# Patient Record
Sex: FEMALE | Race: WHITE | Employment: FULL TIME | URBAN - METROPOLITAN AREA
[De-identification: names, ages, dates, MRNs, and addresses within clinical notes are randomized per-mention and may not be internally consistent; named-entity substitution may affect disease eponyms.]

---

## 2017-04-05 PROBLEM — R73.09 ELEVATED HEMOGLOBIN A1C: Status: ACTIVE | Noted: 2017-04-05

## 2017-12-09 ENCOUNTER — OFFICE VISIT (OUTPATIENT)
Dept: FAMILY MEDICINE CLINIC | Facility: CLINIC | Age: 37
End: 2017-12-09

## 2017-12-09 VITALS
WEIGHT: 111 LBS | OXYGEN SATURATION: 99 % | DIASTOLIC BLOOD PRESSURE: 88 MMHG | RESPIRATION RATE: 20 BRPM | SYSTOLIC BLOOD PRESSURE: 112 MMHG | HEART RATE: 89 BPM | TEMPERATURE: 99 F | BODY MASS INDEX: 21.79 KG/M2 | HEIGHT: 60 IN

## 2017-12-09 DIAGNOSIS — R05.9 COUGH: Primary | ICD-10-CM

## 2017-12-09 PROCEDURE — 99202 OFFICE O/P NEW SF 15 MIN: CPT | Performed by: NURSE PRACTITIONER

## 2017-12-09 NOTE — PROGRESS NOTES
CHIEF COMPLAINT:   Patient presents with:  Cold: had a cold needs a note to return back to work      HPI:   Ce Vital is a 30year old female who presents after recovering from cold symptoms x3 days. Missed 3 days of work and returned Friday.  The GENERAL: well developed, well nourished,in no apparent distress  SKIN: no rashes,no suspicious lesions  HEAD: atraumatic, normocephalic.     EYES: conjunctiva clear, EOM intact  EARS: TM's clear, no bulging, no retraction,no fluid, bony landmarks present

## 2018-04-16 PROBLEM — R03.0 ELEVATED BLOOD PRESSURE READING WITHOUT DIAGNOSIS OF HYPERTENSION: Status: ACTIVE | Noted: 2018-04-16

## 2018-04-16 PROCEDURE — 88175 CYTOPATH C/V AUTO FLUID REDO: CPT | Performed by: FAMILY MEDICINE

## 2021-10-07 ENCOUNTER — HOSPITAL ENCOUNTER (EMERGENCY)
Age: 41
Discharge: HOME | End: 2021-10-07
Attending: EMERGENCY MEDICINE

## 2021-10-07 VITALS
RESPIRATION RATE: 16 BRPM | HEART RATE: 68 BPM | BODY MASS INDEX: 23.81 KG/M2 | WEIGHT: 121.25 LBS | SYSTOLIC BLOOD PRESSURE: 148 MMHG | HEIGHT: 60 IN | TEMPERATURE: 98.3 F | DIASTOLIC BLOOD PRESSURE: 68 MMHG | OXYGEN SATURATION: 100 %

## 2021-10-07 DIAGNOSIS — M54.50 ACUTE RIGHT-SIDED LOW BACK PAIN WITHOUT SCIATICA: Primary | ICD-10-CM

## 2021-10-07 LAB
ALBUMIN SERPL-MCNC: 3.6 G/DL (ref 3.6–5.1)
ALBUMIN/GLOB SERPL: 0.9 {RATIO} (ref 1–2.4)
ALP SERPL-CCNC: 103 UNITS/L (ref 45–117)
ALT SERPL-CCNC: 40 UNITS/L
ANION GAP SERPL CALC-SCNC: 10 MMOL/L (ref 10–20)
APPEARANCE UR: CLEAR
AST SERPL-CCNC: 92 UNITS/L
BASOPHILS # BLD: 0.1 K/MCL (ref 0–0.3)
BASOPHILS NFR BLD: 1 %
BILIRUB SERPL-MCNC: 0.5 MG/DL (ref 0.2–1)
BILIRUB UR QL STRIP: NEGATIVE
BUN SERPL-MCNC: 7 MG/DL (ref 6–20)
BUN/CREAT SERPL: 8 (ref 7–25)
CALCIUM SERPL-MCNC: 9.6 MG/DL (ref 8.4–10.2)
CHLORIDE SERPL-SCNC: 105 MMOL/L (ref 98–107)
CO2 SERPL-SCNC: 25 MMOL/L (ref 21–32)
COLOR UR: YELLOW
CREAT SERPL-MCNC: 0.83 MG/DL (ref 0.51–0.95)
DEPRECATED RDW RBC: 44.4 FL (ref 39–50)
EOSINOPHIL # BLD: 0 K/MCL (ref 0–0.5)
EOSINOPHIL NFR BLD: 0 %
ERYTHROCYTE [DISTWIDTH] IN BLOOD: 12.5 % (ref 11–15)
FASTING DURATION TIME PATIENT: ABNORMAL H
GFR SERPLBLD BASED ON 1.73 SQ M-ARVRAT: 89 ML/MIN
GLOBULIN SER-MCNC: 4.2 G/DL (ref 2–4)
GLUCOSE SERPL-MCNC: 90 MG/DL (ref 70–99)
GLUCOSE UR STRIP-MCNC: NEGATIVE MG/DL
HCG UR QL: NEGATIVE
HCT VFR BLD CALC: 40.2 % (ref 36–46.5)
HGB BLD-MCNC: 13.4 G/DL (ref 12–15.5)
HGB UR QL STRIP: ABNORMAL
IMM GRANULOCYTES # BLD AUTO: 0 K/MCL (ref 0–0.2)
IMM GRANULOCYTES # BLD: 0 %
KETONES UR STRIP-MCNC: NEGATIVE MG/DL
LEUKOCYTE ESTERASE UR QL STRIP: NEGATIVE
LIPASE SERPL-CCNC: 119 UNITS/L (ref 73–393)
LYMPHOCYTES # BLD: 2.3 K/MCL (ref 1–4.8)
LYMPHOCYTES NFR BLD: 26 %
MCH RBC QN AUTO: 32.4 PG (ref 26–34)
MCHC RBC AUTO-ENTMCNC: 33.3 G/DL (ref 32–36.5)
MCV RBC AUTO: 97.1 FL (ref 78–100)
MONOCYTES # BLD: 0.7 K/MCL (ref 0.3–0.9)
MONOCYTES NFR BLD: 8 %
NEUTROPHILS # BLD: 5.7 K/MCL (ref 1.8–7.7)
NEUTROPHILS NFR BLD: 65 %
NITRITE UR QL STRIP: NEGATIVE
NRBC BLD MANUAL-RTO: 0 /100 WBC
PH UR STRIP: 5.5 UNITS (ref 5–7)
PLATELET # BLD AUTO: 435 K/MCL (ref 140–450)
POTASSIUM SERPL-SCNC: 4 MMOL/L (ref 3.4–5.1)
PROT SERPL-MCNC: 7.8 G/DL (ref 6.4–8.2)
PROT UR STRIP-MCNC: NEGATIVE MG/DL
RBC # BLD: 4.14 MIL/MCL (ref 4–5.2)
SODIUM SERPL-SCNC: 136 MMOL/L (ref 135–145)
SP GR UR STRIP: 1.02 (ref 1–1.03)
UROBILINOGEN UR STRIP-MCNC: 0.2 MG/DL
WBC # BLD: 8.9 K/MCL (ref 4.2–11)

## 2021-10-07 PROCEDURE — 99284 EMERGENCY DEPT VISIT MOD MDM: CPT | Performed by: EMERGENCY MEDICINE

## 2021-10-07 PROCEDURE — 83690 ASSAY OF LIPASE: CPT | Performed by: EMERGENCY MEDICINE

## 2021-10-07 PROCEDURE — 80053 COMPREHEN METABOLIC PANEL: CPT | Performed by: EMERGENCY MEDICINE

## 2021-10-07 PROCEDURE — 10002803 HB RX 637: Performed by: EMERGENCY MEDICINE

## 2021-10-07 PROCEDURE — 81003 URINALYSIS AUTO W/O SCOPE: CPT

## 2021-10-07 PROCEDURE — 85025 COMPLETE CBC W/AUTO DIFF WBC: CPT | Performed by: EMERGENCY MEDICINE

## 2021-10-07 PROCEDURE — 84703 CHORIONIC GONADOTROPIN ASSAY: CPT

## 2021-10-07 PROCEDURE — 96374 THER/PROPH/DIAG INJ IV PUSH: CPT

## 2021-10-07 PROCEDURE — 10004651 HB RX, NO CHARGE ITEM: Performed by: EMERGENCY MEDICINE

## 2021-10-07 PROCEDURE — 99283 EMERGENCY DEPT VISIT LOW MDM: CPT

## 2021-10-07 PROCEDURE — 10002800 HB RX 250 W HCPCS: Performed by: EMERGENCY MEDICINE

## 2021-10-07 RX ORDER — LIDOCAINE 50 MG/G
1 PATCH TOPICAL EVERY 24 HOURS
Qty: 7 PATCH | Refills: 0 | Status: SHIPPED | OUTPATIENT
Start: 2021-10-07 | End: 2021-10-14

## 2021-10-07 RX ORDER — LIDOCAINE 4 G/G
1 PATCH TOPICAL ONCE
Status: DISCONTINUED | OUTPATIENT
Start: 2021-10-07 | End: 2021-10-07 | Stop reason: HOSPADM

## 2021-10-07 RX ORDER — ACETAMINOPHEN 325 MG/1
975 TABLET ORAL ONCE
Status: COMPLETED | OUTPATIENT
Start: 2021-10-07 | End: 2021-10-07

## 2021-10-07 RX ORDER — DIAZEPAM 5 MG/1
5 TABLET ORAL ONCE
Status: COMPLETED | OUTPATIENT
Start: 2021-10-07 | End: 2021-10-07

## 2021-10-07 RX ORDER — CYCLOBENZAPRINE HCL 10 MG
10 TABLET ORAL 3 TIMES DAILY PRN
Qty: 9 TABLET | Refills: 0 | Status: SHIPPED | OUTPATIENT
Start: 2021-10-07 | End: 2021-10-10

## 2021-10-07 RX ADMIN — KETOROLAC TROMETHAMINE 15 MG: 30 INJECTION, SOLUTION INTRAMUSCULAR at 14:28

## 2021-10-07 RX ADMIN — DIAZEPAM 5 MG: 5 TABLET ORAL at 14:27

## 2021-10-07 RX ADMIN — LIDOCAINE 1 PATCH: 246 PATCH TOPICAL at 14:27

## 2021-10-07 RX ADMIN — ACETAMINOPHEN 975 MG: 325 TABLET ORAL at 14:27

## 2021-10-07 ASSESSMENT — ENCOUNTER SYMPTOMS
EYE PAIN: 0
NUMBNESS: 0
VOMITING: 0
SHORTNESS OF BREATH: 0
BACK PAIN: 1
EYE DISCHARGE: 0
DIAPHORESIS: 0
WOUND: 0
ABDOMINAL DISTENTION: 0
SORE THROAT: 0
CHILLS: 0
WEAKNESS: 0
DIZZINESS: 0
FEVER: 0
COUGH: 0
NAUSEA: 0
WHEEZING: 0
AGITATION: 0

## 2021-10-07 ASSESSMENT — PAIN SCALES - GENERAL: PAINLEVEL_OUTOF10: 0

## 2022-01-04 ENCOUNTER — WALK IN (OUTPATIENT)
Dept: URGENT CARE | Age: 42
End: 2022-01-04
Attending: FAMILY MEDICINE

## 2022-01-04 VITALS
HEIGHT: 60 IN | TEMPERATURE: 98.6 F | SYSTOLIC BLOOD PRESSURE: 158 MMHG | WEIGHT: 125 LBS | RESPIRATION RATE: 18 BRPM | BODY MASS INDEX: 24.54 KG/M2 | DIASTOLIC BLOOD PRESSURE: 96 MMHG | OXYGEN SATURATION: 100 % | HEART RATE: 87 BPM

## 2022-01-04 DIAGNOSIS — Z20.822 SUSPECTED COVID-19 VIRUS INFECTION: ICD-10-CM

## 2022-01-04 DIAGNOSIS — U07.1 COVID-19 VIRUS DETECTED: Primary | ICD-10-CM

## 2022-01-04 PROCEDURE — 99202 OFFICE O/P NEW SF 15 MIN: CPT

## 2022-01-04 PROCEDURE — C9803 HOPD COVID-19 SPEC COLLECT: HCPCS

## 2022-01-04 PROCEDURE — U0003 INFECTIOUS AGENT DETECTION BY NUCLEIC ACID (DNA OR RNA); SEVERE ACUTE RESPIRATORY SYNDROME CORONAVIRUS 2 (SARS-COV-2) (CORONAVIRUS DISEASE [COVID-19]), AMPLIFIED PROBE TECHNIQUE, MAKING USE OF HIGH THROUGHPUT TECHNOLOGIES AS DESCRIBED BY CMS-2020-01-R: HCPCS | Performed by: FAMILY MEDICINE

## 2022-01-04 RX ORDER — DESOGESTREL AND ETHINYL ESTRADIOL 0.15-0.03
1 KIT ORAL DAILY
COMMUNITY

## 2022-01-04 RX ORDER — BENZONATATE 200 MG/1
200 CAPSULE ORAL 3 TIMES DAILY PRN
Qty: 21 CAPSULE | Refills: 0 | Status: SHIPPED | OUTPATIENT
Start: 2022-01-04

## 2022-01-04 ASSESSMENT — ENCOUNTER SYMPTOMS
WHEEZING: 0
SORE THROAT: 1
SPUTUM PRODUCTION: 0
SINUS PAIN: 1
COUGH: 1
FEVER: 1
SHORTNESS OF BREATH: 0

## 2022-01-04 ASSESSMENT — PAIN SCALES - GENERAL: PAINLEVEL: 0

## 2022-01-05 LAB
SARS-COV-2 RNA RESP QL NAA+PROBE: DETECTED
SERVICE CMNT-IMP: ABNORMAL

## (undated) NOTE — LETTER
Date: 12/9/2017    Patient Name: Milagros Cha      To Whom it may concern: The above patient was seen at the [facility]. The patient may return to work on 12/11/17.        Sincerely,      LEANDER Perez